# Patient Record
Sex: FEMALE | Race: WHITE | NOT HISPANIC OR LATINO | Employment: STUDENT | URBAN - METROPOLITAN AREA
[De-identification: names, ages, dates, MRNs, and addresses within clinical notes are randomized per-mention and may not be internally consistent; named-entity substitution may affect disease eponyms.]

---
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
## 2021-12-13 ENCOUNTER — TELEPHONE (OUTPATIENT)
Dept: SCHEDULING | Facility: IMAGING CENTER | Age: 21
End: 2021-12-13

## 2021-12-15 ENCOUNTER — OFFICE VISIT (OUTPATIENT)
Dept: MEDICAL GROUP | Facility: PHYSICIAN GROUP | Age: 21
End: 2021-12-15
Payer: COMMERCIAL

## 2021-12-15 VITALS
OXYGEN SATURATION: 98 % | TEMPERATURE: 98.8 F | HEART RATE: 78 BPM | SYSTOLIC BLOOD PRESSURE: 102 MMHG | HEIGHT: 69 IN | RESPIRATION RATE: 20 BRPM | BODY MASS INDEX: 29.62 KG/M2 | DIASTOLIC BLOOD PRESSURE: 68 MMHG | WEIGHT: 200 LBS

## 2021-12-15 DIAGNOSIS — Z76.89 ENCOUNTER TO ESTABLISH CARE: ICD-10-CM

## 2021-12-15 DIAGNOSIS — Z01.419 WELL WOMAN EXAM: ICD-10-CM

## 2021-12-15 PROCEDURE — 99395 PREV VISIT EST AGE 18-39: CPT | Performed by: NURSE PRACTITIONER

## 2021-12-15 RX ORDER — DROSPIRENONE AND ETHINYL ESTRADIOL 0.02-3(28)
KIT ORAL
COMMUNITY
Start: 2021-10-27 | End: 2023-10-30

## 2021-12-15 ASSESSMENT — PATIENT HEALTH QUESTIONNAIRE - PHQ9
CLINICAL INTERPRETATION OF PHQ2 SCORE: 3
5. POOR APPETITE OR OVEREATING: 1 - SEVERAL DAYS
SUM OF ALL RESPONSES TO PHQ QUESTIONS 1-9: 7

## 2021-12-15 NOTE — ASSESSMENT & PLAN NOTE
Graciela is here today to establish care with a new primary care provider. She does not have any acute or chronic problems.

## 2021-12-15 NOTE — PROGRESS NOTES
Subjective  Chief Complaint  Establish Care and Annual Well Woman without Pap    History of Present Illness  Graciela Posada is a 21 y.o. female who presents for annual exam. She is feeling well and denies any complaints.    Ob-Gyn/ History:    Patient has GYN provider: Yes  /Para:  0/0  Last Pap Smear:  Unknown.  Gyn Surgery:  NA.  Current Contraceptive Method:  OCP. Not currently sexually active.  Last menstrual period:  21.  Periods regular. moderate bleeding. Cramping is moderate.   She does take OTC analgesics for cramps.  No significant bloating/fluid retention, pelvic pain, or dyspareunia. No vaginal discharge  Post-menopausal bleeding: NA  Urinary incontinence: Denies  Folate intake: No     Health Maintenance  Advanced directive: NA   Osteoporosis Screen/ DEXA: NA   PT/vit D for falls prevention: NA   Cholesterol Screening: NA   Diabetes Screening: NA   Aspirin Use: NA    Diet: Overall okay, could use some improvement.   Exercise: Not currently, walking some.   Substance Abuse: Denies   Safe in relationship.   Seat belts, bike helmet, gun safety discussed.  Sun protection used.    Cancer screening  Colorectal Cancer Screening: NA    Lung Cancer Screening: NA    Cervical Cancer Screening: NA   Breast Cancer Screening: NA     Infectious disease screening/Immunizations  --STI Screening: Denies   --Practices safe sex.  --HIV Screening: Denies  --Hepatitis C Screening: NA   --Immunizations:    Influenza: Refised    HPV:  Completed    Tetanus: 2013   Shingles: NA   Pneumococcal : NA     Other immunizations: Up to date on all other immunizations.     Past Medical History    Allergies: Patient has no known allergies.  History reviewed. No pertinent past medical history.  Past Surgical History:   Procedure Laterality Date   • ADENOIDECTOMY       Current Outpatient Medications Ordered in Epic   Medication Sig Dispense Refill   • drospirenone-ethinyl estradiol (LENO) 3-0.02 MG per tablet        No  "current [facility]-ordered facility-administered medications on file.     Family History:    Family History   Problem Relation Age of Onset   • No Known Problems Mother       Personal/Social History:    Social History     Tobacco Use   • Smoking status: Never Smoker   • Smokeless tobacco: Never Used   Vaping Use   • Vaping Use: Every day   • Substances: Nicotine   • Devices: Pre-filled or refillable cartridge, Refillable tank, Pre-filled pod   Substance Use Topics   • Alcohol use: Yes     Alcohol/week: 0.0 oz     Comment: On weekends   • Drug use: No     Social History     Social History Narrative   • Not on file     Review of Systems   General: Negative for fever/chills and expected weight change.  Eyes:  Negative for vision changes, eye pain.  ENT:  Negative for hearing changes, ear pain, congestion, sore throat, and neck pain.   Respiratory:  Negative for cough and dyspnea.    Cardiovascular:  Negative for chest pain and palpitations.  Gastrointestinal:  Negative for nausea/vomiting, changes in bowel habits, and abdominal pain.   Genitourinary:  Negative for dysuria and hematuria.   Musculoskeletal:  Negative for myalgias.   Skin:  Negative for rash.   Heme/Lymph:  Does not bruise/bleed easily.   Neurological:  Negative for numbness/tingling and headaches.   Psychiatric/Behavioral:  Negative for depression.  The patient is not nervous/anxious.      Objective  Physical Exam:   /68 (BP Location: Left arm, Patient Position: Sitting, BP Cuff Size: Adult)   Pulse 78   Temp 37.1 °C (98.8 °F) (Temporal)   Resp 20   Ht 1.753 m (5' 9\")   Wt 90.7 kg (200 lb)   SpO2 98%  Body mass index is 29.53 kg/m².  Wt Readings from Last 4 Encounters:   12/15/21 90.7 kg (200 lb)   01/27/19 79.8 kg (176 lb) (95 %, Z= 1.62)*   01/01/18 95.7 kg (211 lb) (98 %, Z= 2.16)*   12/28/17 95.7 kg (211 lb) (98 %, Z= 2.16)*     * Growth percentiles are based on CDC (Girls, 2-20 Years) data.     Constitutional: Well-developed and " well-nourished. Not diaphoretic. No distress.   Skin: Skin is warm and dry. No rash noted.  Head: Atraumatic without lesions.  Eyes: Conjunctivae and extraocular motions are normal. Pupils are equal, round, and reactive to light. No scleral icterus.   Ears:  External ears unremarkable. Tympanic membranes clear and intact.  Nose: Nares patent. Septum midline. Turbinates without erythema nor edema. No discharge.   Mouth/Throat: Dentition is normal. Tongue normal. Oropharynx is clear and moist. Posterior pharynx without erythema or exudates.  Neck: Supple, trachea midline. Normal range of motion. No thyromegaly present. No lymphadenopathy--cervical or supraclavicular.  Cardiovascular: Regular rate and rhythm, S1 and S2 without murmur, rubs, or gallops.  Lungs: Normal inspiratory effort, CTA bilaterally, no wheezes/rhonchi/rales  Abdomen: Soft, non tender, and without distention. Active bowel sounds in all four quadrants. No rebound, guarding, masses or HSM.  Extremities: No cyanosis, clubbing, erythema, nor edema. Distal pulses intact and symmetric.   Musculoskeletal: All major joints AROM full in all directions without pain.  Neurologic: Grossly intact.  DTRs 2+/3 and symmetric.  No cranial nerve deficit.  Sensation intact.   Psychiatric:  Behavior, mood, and affect are appropriate.      Assessment/Plan  1. Encounter to establish care     2. Well woman exam         Health Maintenance: Records Requested  Labs per orders.  Immunizations per orders.  Patient counseled about skin care, diet, supplements, prenatal vitamins, safe sex and exercise.    Follow-up: Return in about 1 year (around 12/15/2022), or if symptoms worsen or fail to improve.    Please note that this dictation was created using voice recognition software. I have made every reasonable attempt to correct obvious errors, but I expect that there are errors of grammar and possibly content that I did not discover before finalizing the note.    Nely Burk,  APRN  [facility]

## 2022-12-10 NOTE — PROGRESS NOTES
"Subjective:      Graciela Posada is a 17 y.o. female who presents with Rash    Chief Complaint   Patient presents with   • Rash     red bumps/rash on left leg, itchy x2weeks        - This is a very pleasant 17 y.o. female with complaints of itchy rash legs x 2wks. After mosquito bites           ALLERGIES:  Review of patient's allergies indicates no known allergies.     PMH:  No past medical history on file.     MEDS:    Current outpatient prescriptions:   •  clobetasol (TEMOVATE) 0.05 % Ointment, Apply BID x 1 week, Disp: 30 g, Rfl: 0  •  doxycycline (VIBRAMYCIN) 100 MG Tab, Take 1 Tab by mouth every 12 hours for 7 days., Disp: 14 Tab, Rfl: 0  •  predniSONE (DELTASONE) 20 MG Tab, Take 2 Tabs by mouth every morning for 6 days., Disp: 12 Tab, Rfl: 0  •  fluticasone (FLONASE) 50 MCG/ACT nasal spray, Spray 2 Sprays in nose every day., Disp: 1 Bottle, Rfl: 0  •  Pseudoeph-Doxylamine-DM-APAP (NYQUIL PO), Take  by mouth., Disp: , Rfl:     ** I have documented what I find to be significant in regards to past medical, social, family and surgical history  in my HPI or under PMH/PSH/FH review section, otherwise it is contributory **             Rash  Pertinent negatives include no fever or shortness of breath.       Review of Systems   Constitutional: Negative for fever and chills.   Respiratory: Negative for hemoptysis and shortness of breath.    Cardiovascular: Negative for chest pain and orthopnea.   Skin: Positive for rash.   Neurological: Negative for dizziness and focal weakness.          Objective:     /58 mmHg  Pulse 60  Temp(Src) 36.6 °C (97.9 °F)  Resp 16  Ht 1.753 m (5' 9.02\")  Wt 97.07 kg (214 lb)  BMI 31.59 kg/m2  SpO2 98%     Physical Exam   Constitutional: She appears well-developed. No distress.   HENT:   Head: Normocephalic and atraumatic.   Mouth/Throat: Oropharynx is clear and moist.   Eyes: Conjunctivae are normal.   Neck: Neck supple.   Cardiovascular: Regular rhythm.    No murmur " heard.  Pulmonary/Chest: Effort normal. No respiratory distress.   Neurological: She is alert. She exhibits normal muscle tone.   Skin: Skin is warm and dry.   Psychiatric: She has a normal mood and affect. Judgment normal.   Nursing note and vitals reviewed.  red circular maculopapular 0.5-1cm insect bite like lesions on lower limbs             Assessment/Plan:         1. Skin eruption  clobetasol (TEMOVATE) 0.05 % Ointment    doxycycline (VIBRAMYCIN) 100 MG Tab    predniSONE (DELTASONE) 20 MG Tab             Dx & d/c instructions discussed w/ patient and/or family members. Follow up w/ Prvt Dr or here in 3-4 days if not getting better, sooner if needed,  ER if worse and UC/PCP unavailable.        Possible side effects (i.e. Rash, GI upset/constipation, sedation, elevation of BP or sugars) of any medications given discussed.                     0 = understands/communicates without difficulty

## 2023-10-30 ENCOUNTER — HOSPITAL ENCOUNTER (OUTPATIENT)
Dept: LAB | Facility: MEDICAL CENTER | Age: 23
End: 2023-10-30
Attending: PHYSICIAN ASSISTANT
Payer: COMMERCIAL

## 2023-10-30 ENCOUNTER — OFFICE VISIT (OUTPATIENT)
Dept: MEDICAL GROUP | Facility: PHYSICIAN GROUP | Age: 23
End: 2023-10-30
Payer: COMMERCIAL

## 2023-10-30 VITALS
DIASTOLIC BLOOD PRESSURE: 62 MMHG | BODY MASS INDEX: 31.82 KG/M2 | OXYGEN SATURATION: 95 % | HEART RATE: 68 BPM | HEIGHT: 70 IN | RESPIRATION RATE: 16 BRPM | TEMPERATURE: 97.4 F | WEIGHT: 222.25 LBS | SYSTOLIC BLOOD PRESSURE: 106 MMHG

## 2023-10-30 DIAGNOSIS — L68.9 EXCESSIVE HAIR GROWTH: ICD-10-CM

## 2023-10-30 DIAGNOSIS — E66.3 OVERWEIGHT: ICD-10-CM

## 2023-10-30 DIAGNOSIS — Z11.3 SCREENING EXAMINATION FOR SEXUALLY TRANSMITTED DISEASE: ICD-10-CM

## 2023-10-30 DIAGNOSIS — Z11.59 NEED FOR HEPATITIS C SCREENING TEST: ICD-10-CM

## 2023-10-30 DIAGNOSIS — E55.9 VITAMIN D DEFICIENCY: ICD-10-CM

## 2023-10-30 DIAGNOSIS — N92.6 IRREGULAR PERIODS: ICD-10-CM

## 2023-10-30 DIAGNOSIS — Z12.4 SCREENING FOR CERVICAL CANCER: ICD-10-CM

## 2023-10-30 PROBLEM — Z76.89 ENCOUNTER TO ESTABLISH CARE: Status: RESOLVED | Noted: 2021-12-15 | Resolved: 2023-10-30

## 2023-10-30 LAB
25(OH)D3 SERPL-MCNC: 33 NG/ML (ref 30–100)
ALBUMIN SERPL BCP-MCNC: 4.3 G/DL (ref 3.2–4.9)
ALBUMIN/GLOB SERPL: 1.6 G/DL
ALP SERPL-CCNC: 50 U/L (ref 30–99)
ALT SERPL-CCNC: 13 U/L (ref 2–50)
ANION GAP SERPL CALC-SCNC: 10 MMOL/L (ref 7–16)
AST SERPL-CCNC: 15 U/L (ref 12–45)
BASOPHILS # BLD AUTO: 1.1 % (ref 0–1.8)
BASOPHILS # BLD: 0.06 K/UL (ref 0–0.12)
BILIRUB SERPL-MCNC: 0.3 MG/DL (ref 0.1–1.5)
BUN SERPL-MCNC: 13 MG/DL (ref 8–22)
CALCIUM ALBUM COR SERPL-MCNC: 8.8 MG/DL (ref 8.5–10.5)
CALCIUM SERPL-MCNC: 9 MG/DL (ref 8.5–10.5)
CHLORIDE SERPL-SCNC: 106 MMOL/L (ref 96–112)
CHOLEST SERPL-MCNC: 166 MG/DL (ref 100–199)
CO2 SERPL-SCNC: 24 MMOL/L (ref 20–33)
CREAT SERPL-MCNC: 0.89 MG/DL (ref 0.5–1.4)
EOSINOPHIL # BLD AUTO: 0.1 K/UL (ref 0–0.51)
EOSINOPHIL NFR BLD: 1.9 % (ref 0–6.9)
ERYTHROCYTE [DISTWIDTH] IN BLOOD BY AUTOMATED COUNT: 41.4 FL (ref 35.9–50)
FASTING STATUS PATIENT QL REPORTED: NORMAL
GFR SERPLBLD CREATININE-BSD FMLA CKD-EPI: 94 ML/MIN/1.73 M 2
GLOBULIN SER CALC-MCNC: 2.7 G/DL (ref 1.9–3.5)
GLUCOSE SERPL-MCNC: 92 MG/DL (ref 65–99)
HCG UR QL: NEGATIVE
HCT VFR BLD AUTO: 38.9 % (ref 37–47)
HCV AB SER QL: NORMAL
HDLC SERPL-MCNC: 60 MG/DL
HGB BLD-MCNC: 13.1 G/DL (ref 12–16)
HIV 1+2 AB+HIV1 P24 AG SERPL QL IA: NORMAL
IMM GRANULOCYTES # BLD AUTO: 0.01 K/UL (ref 0–0.11)
IMM GRANULOCYTES NFR BLD AUTO: 0.2 % (ref 0–0.9)
LDLC SERPL CALC-MCNC: 84 MG/DL
LYMPHOCYTES # BLD AUTO: 1.6 K/UL (ref 1–4.8)
LYMPHOCYTES NFR BLD: 29.9 % (ref 22–41)
MCH RBC QN AUTO: 30.5 PG (ref 27–33)
MCHC RBC AUTO-ENTMCNC: 33.7 G/DL (ref 32.2–35.5)
MCV RBC AUTO: 90.7 FL (ref 81.4–97.8)
MONOCYTES # BLD AUTO: 0.5 K/UL (ref 0–0.85)
MONOCYTES NFR BLD AUTO: 9.3 % (ref 0–13.4)
NEUTROPHILS # BLD AUTO: 3.08 K/UL (ref 1.82–7.42)
NEUTROPHILS NFR BLD: 57.6 % (ref 44–72)
NRBC # BLD AUTO: 0 K/UL
NRBC BLD-RTO: 0 /100 WBC (ref 0–0.2)
PLATELET # BLD AUTO: 233 K/UL (ref 164–446)
PMV BLD AUTO: 10.6 FL (ref 9–12.9)
POTASSIUM SERPL-SCNC: 4.2 MMOL/L (ref 3.6–5.5)
PROLACTIN SERPL-MCNC: 19.3 NG/ML (ref 2.8–26)
PROT SERPL-MCNC: 7 G/DL (ref 6–8.2)
RBC # BLD AUTO: 4.29 M/UL (ref 4.2–5.4)
SODIUM SERPL-SCNC: 140 MMOL/L (ref 135–145)
TRIGL SERPL-MCNC: 112 MG/DL (ref 0–149)
TSH SERPL DL<=0.005 MIU/L-ACNC: 3.01 UIU/ML (ref 0.38–5.33)
WBC # BLD AUTO: 5.4 K/UL (ref 4.8–10.8)

## 2023-10-30 PROCEDURE — 87591 N.GONORRHOEAE DNA AMP PROB: CPT

## 2023-10-30 PROCEDURE — 84146 ASSAY OF PROLACTIN: CPT

## 2023-10-30 PROCEDURE — 82306 VITAMIN D 25 HYDROXY: CPT

## 2023-10-30 PROCEDURE — 99214 OFFICE O/P EST MOD 30 MIN: CPT | Performed by: PHYSICIAN ASSISTANT

## 2023-10-30 PROCEDURE — 36415 COLL VENOUS BLD VENIPUNCTURE: CPT

## 2023-10-30 PROCEDURE — 80053 COMPREHEN METABOLIC PANEL: CPT

## 2023-10-30 PROCEDURE — 86803 HEPATITIS C AB TEST: CPT

## 2023-10-30 PROCEDURE — 85025 COMPLETE CBC W/AUTO DIFF WBC: CPT

## 2023-10-30 PROCEDURE — 3078F DIAST BP <80 MM HG: CPT | Performed by: PHYSICIAN ASSISTANT

## 2023-10-30 PROCEDURE — 80061 LIPID PANEL: CPT

## 2023-10-30 PROCEDURE — 84443 ASSAY THYROID STIM HORMONE: CPT

## 2023-10-30 PROCEDURE — 3074F SYST BP LT 130 MM HG: CPT | Performed by: PHYSICIAN ASSISTANT

## 2023-10-30 PROCEDURE — 83498 ASY HYDROXYPROGESTERONE 17-D: CPT

## 2023-10-30 PROCEDURE — 87389 HIV-1 AG W/HIV-1&-2 AB AG IA: CPT

## 2023-10-30 PROCEDURE — 81025 URINE PREGNANCY TEST: CPT

## 2023-10-30 PROCEDURE — 87491 CHLMYD TRACH DNA AMP PROBE: CPT

## 2023-10-30 ASSESSMENT — ENCOUNTER SYMPTOMS
CHILLS: 0
FEVER: 0
SHORTNESS OF BREATH: 0

## 2023-10-30 ASSESSMENT — PATIENT HEALTH QUESTIONNAIRE - PHQ9: CLINICAL INTERPRETATION OF PHQ2 SCORE: 0

## 2023-10-30 NOTE — PROGRESS NOTES
"Subjective:     CC: establish care; prior [facility]    HPI:   Graciela presents today with the following:    Problem   Excessive Hair Growth    Chronic, uncontrolled.  Started a couple years ago.  Has to wax her chin.     Irregular Periods    Chronic, uncontrolled.  Started a couple years ago.  Will go 15-30+ days between periods.  Previously was every 28 days.  Menarche: 12  Occasionally painful and heavy but not consistently.     Encounter to Establish Care (Resolved)         ROS:  Review of Systems   Constitutional:  Negative for chills and fever.   Respiratory:  Negative for shortness of breath.    Cardiovascular:  Negative for chest pain.       Objective:     Exam:  /62 (BP Location: Right arm, Patient Position: Sitting, BP Cuff Size: Large adult)   Pulse 68   Temp 36.3 °C (97.4 °F) (Temporal)   Resp 16   Ht 1.778 m (5' 10\")   Wt 101 kg (222 lb 4 oz)   LMP 10/18/2023 (Approximate)   SpO2 95%   Breastfeeding No   BMI 31.89 kg/m²  Body mass index is 31.89 kg/m².    Physical Exam        Assessment & Plan:     23 y.o. female with the following -     1. Vitamin D deficiency  Chronic, control unknown.  Checking labs.    2. Excessive hair growth  Chronic, uncontrolled.  Checking labs.  No history of PCOS diagnosis.    - 17-OH PROGESTERONE; Future  - PROLACTIN; Future  - HCG QUALITATIVE UR; Future    3. Irregular periods  Chronic, uncontrolled.  Checking labs.  No history of PCOS diagnosis.    - 17-OH PROGESTERONE; Future  - PROLACTIN; Future  - HCG QUALITATIVE UR; Future    4. Overweight  Chronic, uncontrolled.  Checking labs.    - CBC WITH DIFFERENTIAL; Future  - Comp Metabolic Panel; Future  - Lipid Profile; Future  - TSH WITH REFLEX TO FT4; Future  - VITAMIN D,25 HYDROXY (DEFICIENCY); Future    5. Screening examination for sexually transmitted disease    - HIV AG/AB COMBO ASSAY SCREENING; Future  - Chlamydia/GC, PCR (Urine); Future    6. Screening for cervical cancer  Recommend following up for " Pap either here or through OB/GYN.    7. Need for hepatitis C screening test    - HEP C VIRUS ANTIBODY; Future      Have labs drawn.    Healthcare Maintenance:            Return if symptoms worsen or fail to improve, for lab discussion.    Please note that this dictation was created using voice recognition software. I have made every reasonable attempt to correct obvious errors, but I expect that there are errors of grammar and possibly content that I did not discover before finalizing the note.

## 2023-10-31 LAB
C TRACH DNA SPEC QL NAA+PROBE: NEGATIVE
N GONORRHOEA DNA SPEC QL NAA+PROBE: NEGATIVE
SPECIMEN SOURCE: NORMAL

## 2023-11-03 LAB — 17OHP SERPL-MCNC: 142.45 NG/DL
